# Patient Record
Sex: MALE | Race: WHITE | Employment: OTHER | URBAN - NONMETROPOLITAN AREA
[De-identification: names, ages, dates, MRNs, and addresses within clinical notes are randomized per-mention and may not be internally consistent; named-entity substitution may affect disease eponyms.]

---

## 2017-05-24 ENCOUNTER — HOSPITAL ENCOUNTER (OUTPATIENT)
Age: 47
Discharge: HOME | End: 2017-05-24
Payer: COMMERCIAL

## 2017-05-24 LAB
ALBUMIN SERPL-MCNC: 4.3 G/DL (ref 3.5–5.2)
ALBUMIN/GLOBULIN RATIO: 1.4 (ref 1–2.5)
ALP BLD-CCNC: 89 U/L (ref 40–129)
ALT SERPL-CCNC: 36 U/L (ref 5–41)
AST SERPL-CCNC: 26 U/L
BILIRUB SERPL-MCNC: 0.3 MG/DL (ref 0.3–1.2)
BILIRUBIN DIRECT: <0.08 MG/DL
BILIRUBIN, INDIRECT: NORMAL MG/DL (ref 0–1)
BUN BLDV-MCNC: 7 MG/DL (ref 6–20)
CREAT SERPL-MCNC: 0.8 MG/DL (ref 0.7–1.2)
GFR AFRICAN AMERICAN: >60 ML/MIN
GFR NON-AFRICAN AMERICAN: >60 ML/MIN
GFR SERPL CREATININE-BSD FRML MDRD: NORMAL ML/MIN/{1.73_M2}
GFR SERPL CREATININE-BSD FRML MDRD: NORMAL ML/MIN/{1.73_M2}
GLOBULIN: NORMAL G/DL (ref 1.5–3.8)
TOTAL PROTEIN: 7.3 G/DL (ref 6.4–8.3)

## 2017-05-24 PROCEDURE — 82565 ASSAY OF CREATININE: CPT

## 2017-05-24 PROCEDURE — 36415 COLL VENOUS BLD VENIPUNCTURE: CPT

## 2017-05-24 PROCEDURE — 84520 ASSAY OF UREA NITROGEN: CPT

## 2017-05-24 PROCEDURE — 80076 HEPATIC FUNCTION PANEL: CPT

## 2018-05-20 ENCOUNTER — APPOINTMENT (OUTPATIENT)
Dept: GENERAL RADIOLOGY | Age: 48
End: 2018-05-20
Payer: COMMERCIAL

## 2018-05-20 ENCOUNTER — HOSPITAL ENCOUNTER (EMERGENCY)
Age: 48
Discharge: HOME | End: 2018-05-20
Payer: COMMERCIAL

## 2018-05-20 VITALS
TEMPERATURE: 98.9 F | DIASTOLIC BLOOD PRESSURE: 82 MMHG | RESPIRATION RATE: 18 BRPM | HEART RATE: 91 BPM | SYSTOLIC BLOOD PRESSURE: 143 MMHG

## 2018-05-20 DIAGNOSIS — L03.114 CELLULITIS OF LEFT UPPER EXTREMITY: ICD-10-CM
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
## 2018-05-20 PROCEDURE — 90715 TDAP VACCINE 7 YRS/> IM: CPT | Performed by: PHYSICIAN ASSISTANT

## 2018-05-20 PROCEDURE — 73090 X-RAY EXAM OF FOREARM: CPT

## 2018-05-20 PROCEDURE — 99283 EMERGENCY DEPT VISIT LOW MDM: CPT

## 2018-05-20 PROCEDURE — 6370000000 HC RX 637 (ALT 250 FOR IP): Performed by: PHYSICIAN ASSISTANT

## 2018-05-20 PROCEDURE — 6360000002 HC RX W HCPCS: Performed by: PHYSICIAN ASSISTANT

## 2018-05-20 PROCEDURE — 90471 IMMUNIZATION ADMIN: CPT | Performed by: PHYSICIAN ASSISTANT

## 2018-05-20 RX ORDER — AMOXICILLIN AND CLAVULANATE POTASSIUM 875; 125 MG/1; MG/1
1 TABLET, FILM COATED ORAL ONCE
Status: COMPLETED | OUTPATIENT
Start: 2018-05-20 | End: 2018-05-20

## 2018-05-20 RX ORDER — AMOXICILLIN AND CLAVULANATE POTASSIUM 875; 125 MG/1; MG/1
1 TABLET, FILM COATED ORAL 2 TIMES DAILY
Qty: 20 TABLET | Refills: 0 | Status: SHIPPED | OUTPATIENT
Start: 2018-05-20 | End: 2018-05-30

## 2018-05-20 RX ADMIN — TETANUS TOXOID, REDUCED DIPHTHERIA TOXOID AND ACELLULAR PERTUSSIS VACCINE, ADSORBED 0.5 ML: 5; 2.5; 8; 8; 2.5 SUSPENSION INTRAMUSCULAR at 12:33

## 2018-05-20 RX ADMIN — AMOXICILLIN AND CLAVULANATE POTASSIUM 1 TABLET: 875; 125 TABLET, FILM COATED ORAL at 12:43

## 2018-05-20 ASSESSMENT — ENCOUNTER SYMPTOMS
VOMITING: 0
BACK PAIN: 0
EYE REDNESS: 0
SORE THROAT: 0
SHORTNESS OF BREATH: 0
DIARRHEA: 0
CHEST TIGHTNESS: 0
RHINORRHEA: 0
COUGH: 0
CONSTIPATION: 0
ABDOMINAL PAIN: 0
WHEEZING: 0
NAUSEA: 0
EYE DISCHARGE: 0
BLOOD IN STOOL: 0

## 2018-05-20 ASSESSMENT — PAIN DESCRIPTION - PAIN TYPE: TYPE: ACUTE PAIN

## 2018-05-20 ASSESSMENT — PAIN DESCRIPTION - ORIENTATION: ORIENTATION: LEFT

## 2018-05-20 ASSESSMENT — PAIN SCALES - GENERAL: PAINLEVEL_OUTOF10: 7

## 2018-05-20 ASSESSMENT — PAIN DESCRIPTION - LOCATION: LOCATION: ARM

## 2018-06-18 ENCOUNTER — HOSPITAL ENCOUNTER (OUTPATIENT)
Age: 48
Setting detail: SPECIMEN
Discharge: HOME | End: 2018-06-18
Payer: COMMERCIAL

## 2018-06-18 ENCOUNTER — OFFICE VISIT (OUTPATIENT)
Dept: UROLOGY | Age: 48
End: 2018-06-18
Payer: COMMERCIAL

## 2018-06-18 VITALS
WEIGHT: 216 LBS | SYSTOLIC BLOOD PRESSURE: 120 MMHG | BODY MASS INDEX: 30.24 KG/M2 | HEIGHT: 71 IN | DIASTOLIC BLOOD PRESSURE: 80 MMHG

## 2018-06-18 DIAGNOSIS — N52.9 ERECTILE DYSFUNCTION, UNSPECIFIED ERECTILE DYSFUNCTION TYPE: Primary | ICD-10-CM

## 2018-06-18 DIAGNOSIS — N52.9 ERECTILE DYSFUNCTION, UNSPECIFIED ERECTILE DYSFUNCTION TYPE: ICD-10-CM

## 2018-06-18 LAB
-: ABNORMAL
AMORPHOUS: ABNORMAL
BACTERIA: ABNORMAL
BILIRUBIN URINE: NEGATIVE
CASTS UA: ABNORMAL /LPF
COLOR: YELLOW
COMMENT UA: ABNORMAL
CRYSTALS, UA: ABNORMAL /HPF
EPITHELIAL CELLS UA: ABNORMAL /HPF (ref 0–5)
GLUCOSE URINE: NEGATIVE
KETONES, URINE: ABNORMAL
LEUKOCYTE ESTERASE, URINE: NEGATIVE
MUCUS: ABNORMAL
NITRITE, URINE: NEGATIVE
OTHER OBSERVATIONS UA: ABNORMAL
PH UA: 6 (ref 5–9)
PROTEIN UA: NEGATIVE
RBC UA: ABNORMAL /HPF (ref 0–2)
RENAL EPITHELIAL, UA: ABNORMAL /HPF
SPECIFIC GRAVITY UA: >1.03 (ref 1.01–1.02)
TRICHOMONAS: ABNORMAL
TURBIDITY: ABNORMAL
URINE HGB: NEGATIVE
UROBILINOGEN, URINE: NORMAL
WBC UA: ABNORMAL /HPF (ref 0–5)
YEAST: ABNORMAL

## 2018-06-18 PROCEDURE — 81001 URINALYSIS AUTO W/SCOPE: CPT

## 2018-06-18 PROCEDURE — G8417 CALC BMI ABV UP PARAM F/U: HCPCS | Performed by: NURSE PRACTITIONER

## 2018-06-18 PROCEDURE — 4004F PT TOBACCO SCREEN RCVD TLK: CPT | Performed by: NURSE PRACTITIONER

## 2018-06-18 PROCEDURE — G8427 DOCREV CUR MEDS BY ELIG CLIN: HCPCS | Performed by: NURSE PRACTITIONER

## 2018-06-18 PROCEDURE — 99214 OFFICE O/P EST MOD 30 MIN: CPT | Performed by: NURSE PRACTITIONER

## 2018-06-18 PROCEDURE — 87086 URINE CULTURE/COLONY COUNT: CPT

## 2018-06-18 RX ORDER — SILDENAFIL CITRATE 20 MG/1
TABLET ORAL
Qty: 30 TABLET | Refills: 3 | Status: SHIPPED | OUTPATIENT
Start: 2018-06-18 | End: 2019-09-18 | Stop reason: SDUPTHER

## 2018-06-18 ASSESSMENT — ENCOUNTER SYMPTOMS
COLOR CHANGE: 0
WHEEZING: 0
EYE REDNESS: 0
BACK PAIN: 0
CONSTIPATION: 0
COUGH: 0
EYE PAIN: 0
ABDOMINAL PAIN: 0
SHORTNESS OF BREATH: 0
VOMITING: 0
NAUSEA: 0

## 2018-06-19 LAB
CULTURE: NORMAL
Lab: NORMAL
SPECIMEN DESCRIPTION: NORMAL
STATUS: NORMAL

## 2018-08-23 ENCOUNTER — HOSPITAL ENCOUNTER (EMERGENCY)
Age: 48
Discharge: HOME | End: 2018-08-23
Attending: EMERGENCY MEDICINE
Payer: COMMERCIAL

## 2018-08-23 ENCOUNTER — APPOINTMENT (OUTPATIENT)
Dept: CT IMAGING | Age: 48
End: 2018-08-23
Payer: COMMERCIAL

## 2018-08-23 ENCOUNTER — APPOINTMENT (OUTPATIENT)
Dept: GENERAL RADIOLOGY | Age: 48
End: 2018-08-23
Payer: COMMERCIAL

## 2018-08-23 VITALS
TEMPERATURE: 97.5 F | DIASTOLIC BLOOD PRESSURE: 86 MMHG | HEART RATE: 77 BPM | WEIGHT: 220 LBS | SYSTOLIC BLOOD PRESSURE: 136 MMHG | HEIGHT: 72 IN | BODY MASS INDEX: 29.8 KG/M2 | RESPIRATION RATE: 16 BRPM | OXYGEN SATURATION: 98 %
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
## 2018-08-23 DIAGNOSIS — M79.601 RIGHT ARM PAIN: ICD-10-CM

## 2018-08-23 DIAGNOSIS — R51.9 NONINTRACTABLE HEADACHE, UNSPECIFIED CHRONICITY PATTERN, UNSPECIFIED HEADACHE TYPE: ICD-10-CM

## 2018-08-23 PROCEDURE — 72125 CT NECK SPINE W/O DYE: CPT

## 2018-08-23 PROCEDURE — 70450 CT HEAD/BRAIN W/O DYE: CPT

## 2018-08-23 PROCEDURE — 6370000000 HC RX 637 (ALT 250 FOR IP): Performed by: EMERGENCY MEDICINE

## 2018-08-23 PROCEDURE — 99284 EMERGENCY DEPT VISIT MOD MDM: CPT

## 2018-08-23 PROCEDURE — 73030 X-RAY EXAM OF SHOULDER: CPT

## 2018-08-23 PROCEDURE — 73060 X-RAY EXAM OF HUMERUS: CPT

## 2018-08-23 PROCEDURE — 73080 X-RAY EXAM OF ELBOW: CPT

## 2018-08-23 RX ORDER — ACETAMINOPHEN 500 MG
1000 TABLET ORAL ONCE
Status: COMPLETED | OUTPATIENT
Start: 2018-08-23 | End: 2018-08-23

## 2018-08-23 RX ORDER — CYCLOBENZAPRINE HCL 10 MG
10 TABLET ORAL 3 TIMES DAILY PRN
Qty: 10 TABLET | Refills: 0 | Status: SHIPPED | OUTPATIENT
Start: 2018-08-23 | End: 2018-09-02

## 2018-08-23 RX ORDER — CYCLOBENZAPRINE HCL 10 MG
10 TABLET ORAL ONCE
Status: COMPLETED | OUTPATIENT
Start: 2018-08-23 | End: 2018-08-23

## 2018-08-23 RX ADMIN — ACETAMINOPHEN 1000 MG: 500 TABLET ORAL at 10:33

## 2018-08-23 RX ADMIN — CYCLOBENZAPRINE 10 MG: 10 TABLET, FILM COATED ORAL at 10:33

## 2018-08-23 ASSESSMENT — ENCOUNTER SYMPTOMS
RHINORRHEA: 0
VOMITING: 0
SORE THROAT: 0
COUGH: 0
BACK PAIN: 1
SHORTNESS OF BREATH: 0
NAUSEA: 0
ABDOMINAL DISTENTION: 0
WHEEZING: 0
DIARRHEA: 0
PHOTOPHOBIA: 0
CONSTIPATION: 0

## 2018-08-23 ASSESSMENT — PAIN DESCRIPTION - PAIN TYPE: TYPE: ACUTE PAIN

## 2018-08-23 ASSESSMENT — PAIN DESCRIPTION - DESCRIPTORS: DESCRIPTORS: THROBBING;SHARP

## 2018-08-23 ASSESSMENT — PAIN SCALES - GENERAL
PAINLEVEL_OUTOF10: 7
PAINLEVEL_OUTOF10: 7

## 2018-08-23 ASSESSMENT — PAIN DESCRIPTION - LOCATION: LOCATION: ARM;HEAD;NECK

## 2018-08-23 ASSESSMENT — PAIN DESCRIPTION - ORIENTATION: ORIENTATION: RIGHT

## 2018-08-23 NOTE — ED NOTES
Discharge education reviewed with patient at this time. Patient verbalized understanding of needing to travel directly to [facility] for urine drug screen, he also verbalized understanding of need to follow-up with PCP and pain control. Patient denies any further questions/concerns at this time.       Jp Avalos RN  08/23/18 1141

## 2018-08-23 NOTE — ED PROVIDER NOTES
Narrative    No narrative on file       Family History   Problem Relation Age of Onset    Diabetes Mother     Heart Disease Mother     Diabetes Father     Other Father        Allergies:  Patient has no known allergies. Home Medications:  Prior to Admission medications    Medication Sig Start Date End Date Taking? Authorizing Provider   cyclobenzaprine (FLEXERIL) 10 MG tablet Take 1 tablet by mouth 3 times daily as needed for Muscle spasms 8/23/18 9/2/18 Yes Lord Lat, DO   Ranitidine HCl (RANITIDINE 75 PO) Take 150 mg by mouth daily   Yes Historical Provider, MD   sildenafil (REVATIO) 20 MG tablet Take 1-5 tabs PRN for ED. Take on an empty stomach 30 minutes prior to activity. 6/18/18   ADRIANA Gramajo - CNP   tadalafil (CIALIS) 5 MG tablet Take 1 tablet by mouth as needed for Erectile Dysfunction 10/14/16   ADRIANA Lowe - CNP       REVIEW OF SYSTEMS    (2-9 systems for level 4, 10 or more for level 5)      Review of Systems   Constitutional: Negative for activity change, appetite change, fatigue and fever. HENT: Negative for congestion, rhinorrhea and sore throat. Eyes: Negative for photophobia and visual disturbance. Respiratory: Negative for cough, shortness of breath and wheezing. Cardiovascular: Negative for chest pain, palpitations and leg swelling. Gastrointestinal: Negative for abdominal distention, constipation, diarrhea, nausea and vomiting. Genitourinary: Negative for decreased urine volume and dysuria. Musculoskeletal: Positive for arthralgias, back pain, myalgias and neck pain. Skin: Negative for rash. Neurological: Positive for headaches. Negative for dizziness, weakness, light-headedness and numbness.        PHYSICAL EXAM   (up to 7 for level 4, 8 or more for level 5)      INITIAL VITALS:   BP (!) 129/90   Pulse 77   Temp 97.5 °F (36.4 °C) (Tympanic)   Resp 16   Ht 6' (1.829 m)   Wt 220 lb (99.8 kg)   SpO2 100%   BMI 29.84 kg/m² Physical Exam   Constitutional: He is oriented to person, place, and time. Patient is awake alert, oriented to person place and time, extraocular movements are intact, he holds his right arm in contracture, and 90° flexion. HENT:   Head: Normocephalic and atraumatic. Eyes: Conjunctivae are normal. Right eye exhibits no discharge. Left eye exhibits no discharge. Neck:   Midline cervical tenderness to palpation, c-collar has been placed. No midline thoracic or lumbar tenderness to palpation. Patient does have pain to the left side paraspinal musculature . No contusion or ecchymosis noted to this area   Cardiovascular: Normal rate, regular rhythm and normal heart sounds. Exam reveals no gallop and no friction rub. No murmur heard. Pulmonary/Chest: Effort normal and breath sounds normal. No respiratory distress. He has no wheezes. He has no rales. He exhibits no tenderness. Abdominal: Soft. He exhibits no distension and no mass. There is no tenderness. There is no rebound and no guarding. Musculoskeletal:   Patient does have pain to the right trapezius musculature, as well as the anterior aspect of the right humerus. 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 The does have some mild tenderness with movement. He also has pain over the humerus on the right side, as well as some pain with flexion and extension although only approximately 5° range of motion at that joint which is chronic. No deformity noted no erythema or edema noted. He has palpable radial pulses are equal bilaterally with 5 out of 5 hand grasp strength. He moves his lower extremities at hips knees and ankles and full range of motion, without pain, pelvis is stable. He does have 5 out of 5 lower extremity motor strength, and sensation of light touch intact in his lower extremities. Neurological: He is alert and oriented to person, place, and time. No cranial nerve deficit.  He exhibits normal muscle tone. Coordination normal.   Skin: Skin is warm and dry. No rash noted. Nursing note and vitals reviewed. DIFFERENTIAL  DIAGNOSIS     Patient with fall, positive loss of consciousness, fall was mechanical.  He hit his head is complaining of headache at this time. He is neurovascularly intact but continues to complain of a headache, as well as neck pain. Given loss of consciousness and continued headache plan to get CT head as well as CT cervical.  We'll plan on x-ray imaging of his right upper arm. Pain is more musculoskeletal in his lower back and we'll cover with Tylenol as well as Flexeril. Patient also took and said this morning    PLAN (LABS / IMAGING / EKG):  Orders Placed This Encounter   Procedures    CT Head WO Contrast    CT Cervical Spine WO Contrast    XR SHOULDER RIGHT (MIN 2 VIEWS)    XR HUMERUS RIGHT (MIN 2 VIEWS)    XR ELBOW RIGHT (MIN 3 VIEWS)    Cervical collar       MEDICATIONS ORDERED:  Orders Placed This Encounter   Medications    acetaminophen (TYLENOL) tablet 1,000 mg    cyclobenzaprine (FLEXERIL) tablet 10 mg    cyclobenzaprine (FLEXERIL) 10 MG tablet     Sig: Take 1 tablet by mouth 3 times daily as needed for Muscle spasms     Dispense:  10 tablet     Refill:  0       DIAGNOSTIC RESULTS / EMERGENCY DEPARTMENT COURSE / MDM     LABS:  No results found for this visit on 08/23/18. IMPRESSION: fall, head ache, neck pain, R arm pain    RADIOLOGY:  Xr Shoulder Right (min 2 Views)    Result Date: 8/23/2018  REPORT: 3 views right shoulder INDICATION: Pain status post fall FINDINGS: No acute fracture or dislocation is seen. Mild degenerative changes of the glenohumeral joint. [facility] joint and subacromial space are normal. Clavicle appears to be intact. Benign bone islands.  Final report electronically signed by Olivia Alvarado on 8/23/2018 11:03 AM    NO ACUTE FRACTURE    Xr Humerus Right (min 2 Views)    Result Date: 8/23/2018  REPORT: 2 views right humerus INDICATION: Pain status post fall FINDINGS: No acute fracture. No lytic or blastic lesion. Final report electronically signed by Dixie Adams on 8/23/2018 11:02 AM    NO ACUTE FRACTURE    Xr Elbow Right (min 3 Views)    Result Date: 8/23/2018  REPORT: 3 views right elbow INDICATION: Pain status post fall FINDINGS: No acute fracture or dislocation is seen. Joint spaces are preserved. No joint effusion or soft tissue swelling. Final report electronically signed by Dixie Adams on 8/23/2018 11:01 AM    NORMAL    Ct Head Wo Contrast    Result Date: 8/23/2018  REPORT: CT head without contrast TECHNIQUE: Contiguous thin axial slices through the head obtained without IV contrast. INDICATION: Persistent headache, loss of consciousness, fall FINDINGS: No evidence of acute cerebral cortical infarction, hemorrhage or mass lesion. Normal white matter density. Normal cerebral and cerebellar volume. No hydrocephalus. Calvarium is intact. Paranasal sinuses and mastoid air cells are clear. Final report electronically signed by Dixie Adams on 8/23/2018 10:29 AM    Normal CT head    Ct Cervical Spine Wo Contrast    Result Date: 8/23/2018  REPORT: CT cervical spine without contrast TECHNIQUE: Contiguous thin axial slices through the cervical spine obtained without contrast. Axial, coronal and sagittal reformats made. INDICATION: Neck pain status post fall, loss of consciousness FINDINGS: No previous examinations available for comparison. Underlying hypertrophic degenerative changes Vertebral body heights and alignment are well-maintained. There is no evidence of acute fracture. The odontoid process, occipital condyles and C1 arch are intact. Disc heights are preserved. No significant central canal or neural foraminal stenosis. No acute disc herniation is seen.  Prevertebral soft tissues are normal. Final report electronically signed by Dixie Adams on 8/23/2018 11:01 AM    No findings to suggest acute cervical spine injury        EMERGENCY DEPARTMENT

## 2018-08-23 NOTE — ED NOTES
Lab called stating no one is available to do drug screen and to contact nursing supervisor. I contacted [place] supervisor who advised pt is to go to [facility] if during normal business hours. Notified nurse BACILIO Gray  08/23/18 1016

## 2018-09-19 ENCOUNTER — OFFICE VISIT (OUTPATIENT)
Dept: UROLOGY | Age: 48
End: 2018-09-19
Payer: COMMERCIAL

## 2018-09-19 VITALS
DIASTOLIC BLOOD PRESSURE: 80 MMHG | WEIGHT: 220 LBS | HEIGHT: 72 IN | SYSTOLIC BLOOD PRESSURE: 130 MMHG | BODY MASS INDEX: 29.8 KG/M2

## 2018-09-19 DIAGNOSIS — N52.9 ERECTILE DYSFUNCTION, UNSPECIFIED ERECTILE DYSFUNCTION TYPE: Primary | ICD-10-CM

## 2018-09-19 PROCEDURE — 4004F PT TOBACCO SCREEN RCVD TLK: CPT | Performed by: NURSE PRACTITIONER

## 2018-09-19 PROCEDURE — G8427 DOCREV CUR MEDS BY ELIG CLIN: HCPCS | Performed by: NURSE PRACTITIONER

## 2018-09-19 PROCEDURE — G8417 CALC BMI ABV UP PARAM F/U: HCPCS | Performed by: NURSE PRACTITIONER

## 2018-09-19 PROCEDURE — 99213 OFFICE O/P EST LOW 20 MIN: CPT | Performed by: NURSE PRACTITIONER

## 2018-09-19 ASSESSMENT — ENCOUNTER SYMPTOMS
EYE REDNESS: 0
ABDOMINAL PAIN: 0
BACK PAIN: 0
EYE PAIN: 0
NAUSEA: 0
CONSTIPATION: 0
SHORTNESS OF BREATH: 0
COLOR CHANGE: 0
VOMITING: 0
WHEEZING: 0
COUGH: 0

## 2018-09-19 NOTE — PROGRESS NOTES
Subjective:      Patient ID: Jermaine Rosa is a 50 y.o. male. HPI  Patient is a 50 y.o. male in no acute distress and alert and oriented to person, place and time. History  2016 Evaluated for ED. Given cialis 5mg as needed. LOST TO FOLLOW-UP    6/2018 Complaints of ED. He is unable to achieve or maintain an erection that is satisfactory for sexual activity. He denies HTN, DM, NORA, or any other co-morbid conditions. His BMI is 30.13. He does smoke and does not exercise. He did have good results with the cialis, but he can not afford this medication. CHARLEY 18, Started on revatio. Today  Here today for a 3 month follow-up for erectile dysfunction. At his last visit we did start him on Revatio. He is using 20 mg prior to sexual activity. With this dose he is able to achieve and maintain an erection satisfactory for sexual activity. He denies any chest pain or shortness of breath. He does continue to smoke despite recommendations to stop smoking. Sexual Health Inventory For Men  1. How do you rate your confidence that you could get and keep an erection? 2  2. When you had erections with sexual stimulation, how often were your erections hard enough for penetration? 4  3. During sexual intercourse, how often were you able to maintain your erection after you had penetrated your partner? 5  4. During sexual intercourse, ho difficult was it to maintain your erection to completion of intercourse? 4  5. When you attempted sexual intercourse, how often was it satisfactory for you?  4  Total 19, mild ED  Past Medical History:   Diagnosis Date    Erb's palsy as birth trauma     GERD (gastroesophageal reflux disease)      Past Surgical History:   Procedure Laterality Date    DENTAL SURGERY       Family History   Problem Relation Age of Onset    Diabetes Mother     Heart Disease Mother     Diabetes Father     Other Father      Current Outpatient Prescriptions on File Prior to Visit   Medication Sig Dispense Refill    sildenafil (REVATIO) 20 MG tablet Take 1-5 tabs PRN for ED. Take on an empty stomach 30 minutes prior to activity. 30 tablet 3    Ranitidine HCl (RANITIDINE 75 PO) Take 150 mg by mouth daily prn       No current facility-administered medications on file prior to visit. Outpatient Encounter Prescriptions as of 9/19/2018   Medication Sig Dispense Refill    sildenafil (REVATIO) 20 MG tablet Take 1-5 tabs PRN for ED. Take on an empty stomach 30 minutes prior to activity. 30 tablet 3    Ranitidine HCl (RANITIDINE 75 PO) Take 150 mg by mouth daily prn      [DISCONTINUED] tadalafil (CIALIS) 5 MG tablet Take 1 tablet by mouth as needed for Erectile Dysfunction 20 tablet 0     No facility-administered encounter medications on file as of 9/19/2018. Patient has no known allergies. History   Smoking Status    Current Every Day Smoker    Packs/day: 0.50    Types: Cigarettes   Smokeless Tobacco    Never Used       History   Alcohol Use    Yes     Comment: weekend       Vitals:    09/19/18 1537   BP: 130/80       Constitutional: Patient in no acute distress; Neuro: alert and oriented to person place and time. Psych: Mood and affect normal.  Skin: Normal  Lungs: Respiratory effort normal  Cardiovascular:  Normal peripheral pulses  Abdomen: Soft, non-tender, non-distended with no CVA, flank pain, hepatosplenomegaly or hernia. Kidneys normal.  Bladder non-tender and not distended. Lymphatics: no palpable lymphadenopathy  Penis normal  Urethral meatus normal  Scrotal exam normal  Testicles normal bilaterally    No results found for: PSA  Lab Results   Component Value Date    BUN 7 05/24/2017     Lab Results   Component Value Date    CREATININE 0.80 05/24/2017       No results found for this visit on 09/19/18. Review of Systems   Constitutional: Negative for appetite change, chills and fever. Eyes: Negative for pain, redness and visual disturbance.    Respiratory: Negative for cough, shortness

## 2019-02-05 ENCOUNTER — HOSPITAL ENCOUNTER (OUTPATIENT)
Age: 49
Discharge: HOME | End: 2019-02-05
Payer: COMMERCIAL

## 2019-02-05 DIAGNOSIS — Z12.5 SCREENING PSA (PROSTATE SPECIFIC ANTIGEN): ICD-10-CM

## 2019-02-05 DIAGNOSIS — Z00.00 ROUTINE GENERAL MEDICAL EXAMINATION AT A HEALTH CARE FACILITY: ICD-10-CM

## 2019-02-05 LAB
ALT SERPL-CCNC: 21 U/L (ref 5–41)
ANION GAP SERPL CALCULATED.3IONS-SCNC: 12 MMOL/L (ref 9–17)
AST SERPL-CCNC: 22 U/L
BUN BLDV-MCNC: 9 MG/DL (ref 6–20)
BUN/CREAT BLD: 12 (ref 9–20)
CALCIUM SERPL-MCNC: 10 MG/DL (ref 8.6–10.4)
CHLORIDE BLD-SCNC: 103 MMOL/L (ref 98–107)
CHOLESTEROL/HDL RATIO: 2.9
CHOLESTEROL: 161 MG/DL
CO2: 24 MMOL/L (ref 20–31)
CREAT SERPL-MCNC: 0.78 MG/DL (ref 0.7–1.2)
GFR AFRICAN AMERICAN: >60 ML/MIN
GFR NON-AFRICAN AMERICAN: >60 ML/MIN
GFR SERPL CREATININE-BSD FRML MDRD: NORMAL ML/MIN/{1.73_M2}
GFR SERPL CREATININE-BSD FRML MDRD: NORMAL ML/MIN/{1.73_M2}
GLUCOSE BLD-MCNC: 91 MG/DL (ref 70–99)
HDLC SERPL-MCNC: 56 MG/DL
LDL CHOLESTEROL: 93 MG/DL (ref 0–130)
POTASSIUM SERPL-SCNC: 4 MMOL/L (ref 3.7–5.3)
PROSTATE SPECIFIC ANTIGEN: 0.46 UG/L
SODIUM BLD-SCNC: 139 MMOL/L (ref 135–144)
TRIGL SERPL-MCNC: 62 MG/DL
VLDLC SERPL CALC-MCNC: NORMAL MG/DL (ref 1–30)

## 2019-02-05 PROCEDURE — 80048 BASIC METABOLIC PNL TOTAL CA: CPT

## 2019-02-05 PROCEDURE — 84450 TRANSFERASE (AST) (SGOT): CPT

## 2019-02-05 PROCEDURE — 80061 LIPID PANEL: CPT

## 2019-02-05 PROCEDURE — G0103 PSA SCREENING: HCPCS

## 2019-02-05 PROCEDURE — 84460 ALANINE AMINO (ALT) (SGPT): CPT

## 2019-02-05 PROCEDURE — 36415 COLL VENOUS BLD VENIPUNCTURE: CPT

## 2019-09-18 ENCOUNTER — OFFICE VISIT (OUTPATIENT)
Dept: UROLOGY | Age: 49
End: 2019-09-18
Payer: COMMERCIAL

## 2019-09-18 VITALS
HEIGHT: 72 IN | DIASTOLIC BLOOD PRESSURE: 76 MMHG | HEART RATE: 69 BPM | WEIGHT: 215 LBS | BODY MASS INDEX: 29.12 KG/M2 | SYSTOLIC BLOOD PRESSURE: 136 MMHG

## 2019-09-18 DIAGNOSIS — N52.9 ERECTILE DYSFUNCTION, UNSPECIFIED ERECTILE DYSFUNCTION TYPE: Primary | ICD-10-CM

## 2019-09-18 PROCEDURE — 99213 OFFICE O/P EST LOW 20 MIN: CPT | Performed by: UROLOGY

## 2019-09-18 PROCEDURE — G8417 CALC BMI ABV UP PARAM F/U: HCPCS | Performed by: UROLOGY

## 2019-09-18 PROCEDURE — 4004F PT TOBACCO SCREEN RCVD TLK: CPT | Performed by: UROLOGY

## 2019-09-18 PROCEDURE — G8427 DOCREV CUR MEDS BY ELIG CLIN: HCPCS | Performed by: UROLOGY

## 2019-09-18 PROCEDURE — 3017F COLORECTAL CA SCREEN DOC REV: CPT | Performed by: UROLOGY

## 2019-09-18 RX ORDER — SILDENAFIL CITRATE 20 MG/1
TABLET ORAL
Qty: 30 TABLET | Refills: 3 | Status: SHIPPED | OUTPATIENT
Start: 2019-09-18 | End: 2020-11-09

## 2019-09-18 ASSESSMENT — ENCOUNTER SYMPTOMS
WHEEZING: 0
COLOR CHANGE: 0
EYE REDNESS: 0
COUGH: 0
NAUSEA: 0
ABDOMINAL PAIN: 0
EYE PAIN: 0
VOMITING: 0
BACK PAIN: 0
SHORTNESS OF BREATH: 0

## 2019-09-18 NOTE — PROGRESS NOTES
tablet 3    Ranitidine HCl (RANITIDINE 75 PO) Take 150 mg by mouth daily prn       No facility-administered encounter medications on file as of 9/18/2019. Current Outpatient Medications on File Prior to Visit   Medication Sig Dispense Refill    sildenafil (REVATIO) 20 MG tablet Take 1-5 tabs PRN for ED. Take on an empty stomach 30 minutes prior to activity. 30 tablet 3    Ranitidine HCl (RANITIDINE 75 PO) Take 150 mg by mouth daily prn       No current facility-administered medications on file prior to visit. Patient has no known allergies. Family History   Problem Relation Age of Onset    Diabetes Mother     Heart Disease Mother     Diabetes Father     Other Father      Social History     Tobacco Use   Smoking Status Current Every Day Smoker    Packs/day: 0.50    Types: Cigarettes   Smokeless Tobacco Never Used       Social History     Substance and Sexual Activity   Alcohol Use Yes    Comment: weekend       Review of Systems   Constitutional: Negative for appetite change, chills and fever. Eyes: Negative for pain, redness and visual disturbance. Respiratory: Negative for cough, shortness of breath and wheezing. Cardiovascular: Negative for chest pain and leg swelling. Gastrointestinal: Negative for abdominal pain, nausea and vomiting. Genitourinary: Negative for difficulty urinating, discharge, dysuria, flank pain, frequency, hematuria, scrotal swelling and testicular pain. Musculoskeletal: Negative for back pain, joint swelling and myalgias. Skin: Negative for color change, rash and wound. Neurological: Negative for dizziness, tremors and numbness. Hematological: Negative for adenopathy. Does not bruise/bleed easily. /76 (Site: Left Upper Arm, Position: Sitting, Cuff Size: Medium Adult)   Pulse 69   Ht 6' (1.829 m)   Wt 215 lb (97.5 kg)   BMI 29.16 kg/m²       PHYSICAL EXAM:  Constitutional: Patient in no acute distress;    Neuro: alert and oriented to person

## 2020-09-14 ENCOUNTER — OFFICE VISIT (OUTPATIENT)
Dept: UROLOGY | Age: 50
End: 2020-09-14
Payer: COMMERCIAL

## 2020-09-14 VITALS
HEIGHT: 72 IN | SYSTOLIC BLOOD PRESSURE: 158 MMHG | DIASTOLIC BLOOD PRESSURE: 89 MMHG | BODY MASS INDEX: 28.04 KG/M2 | HEART RATE: 72 BPM | TEMPERATURE: 98.7 F | WEIGHT: 207 LBS

## 2020-09-14 PROCEDURE — 99213 OFFICE O/P EST LOW 20 MIN: CPT | Performed by: PHYSICIAN ASSISTANT

## 2020-09-14 PROCEDURE — 4004F PT TOBACCO SCREEN RCVD TLK: CPT | Performed by: PHYSICIAN ASSISTANT

## 2020-09-14 PROCEDURE — 3017F COLORECTAL CA SCREEN DOC REV: CPT | Performed by: PHYSICIAN ASSISTANT

## 2020-09-14 PROCEDURE — G8427 DOCREV CUR MEDS BY ELIG CLIN: HCPCS | Performed by: PHYSICIAN ASSISTANT

## 2020-09-14 PROCEDURE — G8417 CALC BMI ABV UP PARAM F/U: HCPCS | Performed by: PHYSICIAN ASSISTANT

## 2020-09-14 RX ORDER — DICLOFENAC SODIUM 75 MG/1
TABLET, DELAYED RELEASE ORAL PRN
COMMUNITY
Start: 2020-09-10 | End: 2021-09-22

## 2020-09-14 ASSESSMENT — ENCOUNTER SYMPTOMS
EYE REDNESS: 0
VOMITING: 0
NAUSEA: 0
CONSTIPATION: 0
EYE PAIN: 0
BACK PAIN: 0
SHORTNESS OF BREATH: 0
WHEEZING: 0
ABDOMINAL PAIN: 0
COLOR CHANGE: 0
COUGH: 0

## 2020-09-14 NOTE — PATIENT INSTRUCTIONS
SURVEY:    You may be receiving a survey from [facility] regarding your visit today. Please complete the survey to enable us to provide the highest quality of care to you and your family. If you cannot score us a very good on any question, please call the office to discuss how we could of made your experience a very good one. Thank you.

## 2020-09-14 NOTE — PROGRESS NOTES
PSA 0.46 02/05/2019       ASSESSMENT:   Diagnosis Orders   1.  Erectile dysfunction, unspecified erectile dysfunction type             PLAN:  Continue Revatio    Follow-up in 1 year

## 2020-11-09 RX ORDER — SILDENAFIL CITRATE 20 MG/1
TABLET ORAL
Qty: 30 TABLET | Refills: 3 | Status: SHIPPED | OUTPATIENT
Start: 2020-11-09 | End: 2021-09-08 | Stop reason: SDUPTHER

## 2020-11-09 NOTE — TELEPHONE ENCOUNTER
Patient seen within the past 3 months. Patient tolerates this medication well. We will send refills.

## 2021-09-08 ENCOUNTER — OFFICE VISIT (OUTPATIENT)
Dept: UROLOGY | Age: 51
End: 2021-09-08
Payer: MEDICAID

## 2021-09-08 VITALS
DIASTOLIC BLOOD PRESSURE: 98 MMHG | BODY MASS INDEX: 27.67 KG/M2 | HEART RATE: 79 BPM | SYSTOLIC BLOOD PRESSURE: 151 MMHG | WEIGHT: 204 LBS

## 2021-09-08 DIAGNOSIS — N52.9 ERECTILE DYSFUNCTION, UNSPECIFIED ERECTILE DYSFUNCTION TYPE: Primary | ICD-10-CM

## 2021-09-08 PROCEDURE — 3017F COLORECTAL CA SCREEN DOC REV: CPT | Performed by: PHYSICIAN ASSISTANT

## 2021-09-08 PROCEDURE — 99213 OFFICE O/P EST LOW 20 MIN: CPT | Performed by: PHYSICIAN ASSISTANT

## 2021-09-08 PROCEDURE — G8427 DOCREV CUR MEDS BY ELIG CLIN: HCPCS | Performed by: PHYSICIAN ASSISTANT

## 2021-09-08 PROCEDURE — 4004F PT TOBACCO SCREEN RCVD TLK: CPT | Performed by: PHYSICIAN ASSISTANT

## 2021-09-08 PROCEDURE — G8417 CALC BMI ABV UP PARAM F/U: HCPCS | Performed by: PHYSICIAN ASSISTANT

## 2021-09-08 RX ORDER — SILDENAFIL CITRATE 20 MG/1
TABLET ORAL
Qty: 30 TABLET | Refills: 3 | Status: SHIPPED | OUTPATIENT
Start: 2021-09-08 | End: 2022-10-11 | Stop reason: SDUPTHER

## 2021-09-08 ASSESSMENT — ENCOUNTER SYMPTOMS
SHORTNESS OF BREATH: 0
COLOR CHANGE: 0
CONSTIPATION: 0
EYE REDNESS: 0
WHEEZING: 0
ABDOMINAL PAIN: 0
COUGH: 0
VOMITING: 0
NAUSEA: 0
BACK PAIN: 0

## 2021-09-08 NOTE — PROGRESS NOTES
HPI:      Patient is a 44 y.o. male in no acute distress. He is alert and oriented to person, place, and time. History  2016 Evaluated for ED. Given cialis 5mg as needed. LOST TO FOLLOW-UP     6/2018 Complaints of ED. He is unable to achieve or maintain an erection that is satisfactory for sexual activity. He denies HTN, DM, NORA, or any other co-morbid conditions. His BMI is 30.13. He does smoke and does not exercise. He did have good results with the cialis, but he can not afford this medication. CHARLEY 18, Started on revatio. Today:  Patient is here today for annual follow-up for erectile dysfunction. Patient has been taking Revatio 20mg as needed. He is able to achieve and maintain erection with this dosage. He states that if he takes 1 tab on empty stomach he does get a little jittery. If he takes it with food he does not have these symptoms. He denies any chest pain, shortness of breath, dyspnea on exertion. He has not had any erections lasting more than 4 hours. he denies any new or worsening urinary tract symptoms. He is a current every day smoker.         Past Medical History:   Diagnosis Date    Erb's palsy as birth trauma     GERD (gastroesophageal reflux disease)     Wears glasses      Past Surgical History:   Procedure Laterality Date    DENTAL SURGERY       Outpatient Encounter Medications as of 9/8/2021   Medication Sig Dispense Refill    sildenafil (REVATIO) 20 MG tablet TAKE 1-5 TABLETS BY MOUTH AS NEEDED FOR E.D. TAKE ON AN EMPTY [id number] MINUTES PRIOR TO ACTIVITY 30 tablet 3    diclofenac (VOLTAREN) 75 MG EC tablet as needed       [DISCONTINUED] sildenafil (REVATIO) 20 MG tablet TAKE 1-5 TABLETS BY MOUTH AS NEEDED FOR E.D. TAKE ON AN EMPTY [id number] MINUTES PRIOR TO ACTIVITY 30 tablet 3    [DISCONTINUED] Ranitidine HCl (RANITIDINE 75 PO) Take 150 mg by mouth daily prn (Patient not taking: Reported on 9/8/2021)       No facility-administered encounter medications on file as of 9/8/2021. Current Outpatient Medications on File Prior to Visit   Medication Sig Dispense Refill    diclofenac (VOLTAREN) 75 MG EC tablet as needed        No current facility-administered medications on file prior to visit. Patient has no known allergies. Family History   Problem Relation Age of Onset    Diabetes Mother     Heart Disease Mother     Diabetes Father     Other Father      Social History     Tobacco Use   Smoking Status Current Every Day Smoker    Packs/day: 0.50    Types: Cigarettes   Smokeless Tobacco Never Used       Social History     Substance and Sexual Activity   Alcohol Use Yes    Comment: weekend       Review of Systems   Constitutional: Negative for appetite change, chills and fever. Eyes: Negative for redness and visual disturbance. Respiratory: Negative for cough, shortness of breath and wheezing. Cardiovascular: Negative for chest pain and leg swelling. Gastrointestinal: Negative for abdominal pain, constipation, nausea and vomiting. Genitourinary: Negative for decreased urine volume, difficulty urinating, discharge, dysuria, enuresis, flank pain, frequency, hematuria, penile pain, scrotal swelling, testicular pain and urgency. Positive for erectile dysfunction   Musculoskeletal: Negative for back pain, joint swelling and myalgias. Skin: Negative for color change, rash and wound. Neurological: Negative for dizziness, tremors and numbness. Hematological: Negative for adenopathy. Does not bruise/bleed easily. BP (!) 151/98 (Site: Right Upper Arm, Position: Sitting, Cuff Size: Medium Adult)   Pulse 79   Wt 204 lb (92.5 kg)   BMI 27.67 kg/m²       PHYSICAL EXAM:  Constitutional: Patient in no acute distress; Neuro: alert and oriented to person place and time.     Psych: Mood and affect normal.  Lungs: Respiratory effort normal  Abdomen: Soft, non-tender, non-distended  Rectal: deferred       Lab Results   Component Value Date BUN 9 02/05/2019     Lab Results   Component Value Date    CREATININE 0.78 02/05/2019     Lab Results   Component Value Date    PSA 0.46 02/05/2019       ASSESSMENT:   Diagnosis Orders   1.  Erectile dysfunction, unspecified erectile dysfunction type  sildenafil (REVATIO) 20 MG tablet     PLAN:  Continue Revatio 20 mg as needed     Follow-up in 1 year

## 2022-05-11 ENCOUNTER — HOSPITAL ENCOUNTER (OUTPATIENT)
Age: 52
Discharge: HOME | End: 2022-05-11
Payer: MEDICAID

## 2022-05-11 DIAGNOSIS — Z00.00 ROUTINE GENERAL MEDICAL EXAMINATION AT A HEALTH CARE FACILITY: ICD-10-CM

## 2022-05-11 LAB
ALT SERPL-CCNC: 13 U/L (ref 5–41)
ANION GAP SERPL CALCULATED.3IONS-SCNC: 7 MMOL/L (ref 9–17)
AST SERPL-CCNC: 15 U/L
BUN BLDV-MCNC: 15 MG/DL (ref 6–20)
BUN/CREAT BLD: 18 (ref 9–20)
CALCIUM SERPL-MCNC: 10 MG/DL (ref 8.6–10.4)
CHLORIDE BLD-SCNC: 103 MMOL/L (ref 98–107)
CHOLESTEROL/HDL RATIO: 3.1
CHOLESTEROL: 166 MG/DL
CO2: 27 MMOL/L (ref 20–31)
CREAT SERPL-MCNC: 0.82 MG/DL (ref 0.7–1.2)
GFR AFRICAN AMERICAN: >60 ML/MIN
GFR NON-AFRICAN AMERICAN: >60 ML/MIN
GFR SERPL CREATININE-BSD FRML MDRD: ABNORMAL ML/MIN/{1.73_M2}
GFR SERPL CREATININE-BSD FRML MDRD: ABNORMAL ML/MIN/{1.73_M2}
GLUCOSE BLD-MCNC: 97 MG/DL (ref 70–99)
HCT VFR BLD CALC: 44.9 % (ref 40.7–50.3)
HDLC SERPL-MCNC: 53 MG/DL
HEMOGLOBIN: 14.6 G/DL (ref 13–17)
LDL CHOLESTEROL: 97 MG/DL (ref 0–130)
MCH RBC QN AUTO: 30.2 PG (ref 25.2–33.5)
MCHC RBC AUTO-ENTMCNC: 32.5 G/DL (ref 28.4–34.8)
MCV RBC AUTO: 93 FL (ref 82.6–102.9)
NRBC AUTOMATED: 0 PER 100 WBC
PDW BLD-RTO: 13.1 % (ref 11.8–14.4)
PLATELET # BLD: 174 K/UL (ref 138–453)
PMV BLD AUTO: 9.7 FL (ref 8.1–13.5)
POTASSIUM SERPL-SCNC: 4.6 MMOL/L (ref 3.7–5.3)
RBC # BLD: 4.83 M/UL (ref 4.21–5.77)
SODIUM BLD-SCNC: 137 MMOL/L (ref 135–144)
TRIGL SERPL-MCNC: 79 MG/DL
WBC # BLD: 6.7 K/UL (ref 3.5–11.3)

## 2022-05-11 PROCEDURE — 85027 COMPLETE CBC AUTOMATED: CPT

## 2022-05-11 PROCEDURE — 36415 COLL VENOUS BLD VENIPUNCTURE: CPT

## 2022-05-11 PROCEDURE — 80061 LIPID PANEL: CPT

## 2022-05-11 PROCEDURE — 84460 ALANINE AMINO (ALT) (SGPT): CPT

## 2022-05-11 PROCEDURE — 80048 BASIC METABOLIC PNL TOTAL CA: CPT

## 2022-05-11 PROCEDURE — 84450 TRANSFERASE (AST) (SGOT): CPT

## 2022-10-11 ENCOUNTER — OFFICE VISIT (OUTPATIENT)
Dept: UROLOGY | Age: 52
End: 2022-10-11
Payer: MEDICAID

## 2022-10-11 VITALS
DIASTOLIC BLOOD PRESSURE: 83 MMHG | SYSTOLIC BLOOD PRESSURE: 123 MMHG | WEIGHT: 217 LBS | HEART RATE: 76 BPM | BODY MASS INDEX: 29.43 KG/M2

## 2022-10-11 DIAGNOSIS — N52.9 ERECTILE DYSFUNCTION, UNSPECIFIED ERECTILE DYSFUNCTION TYPE: Primary | ICD-10-CM

## 2022-10-11 PROCEDURE — G8484 FLU IMMUNIZE NO ADMIN: HCPCS | Performed by: PHYSICIAN ASSISTANT

## 2022-10-11 PROCEDURE — 4004F PT TOBACCO SCREEN RCVD TLK: CPT | Performed by: PHYSICIAN ASSISTANT

## 2022-10-11 PROCEDURE — G8419 CALC BMI OUT NRM PARAM NOF/U: HCPCS | Performed by: PHYSICIAN ASSISTANT

## 2022-10-11 PROCEDURE — 3017F COLORECTAL CA SCREEN DOC REV: CPT | Performed by: PHYSICIAN ASSISTANT

## 2022-10-11 PROCEDURE — G8427 DOCREV CUR MEDS BY ELIG CLIN: HCPCS | Performed by: PHYSICIAN ASSISTANT

## 2022-10-11 PROCEDURE — 99213 OFFICE O/P EST LOW 20 MIN: CPT | Performed by: PHYSICIAN ASSISTANT

## 2022-10-11 RX ORDER — SILDENAFIL CITRATE 20 MG/1
TABLET ORAL
Qty: 30 TABLET | Refills: 3 | Status: SHIPPED | OUTPATIENT
Start: 2022-10-11

## 2022-10-11 ASSESSMENT — ENCOUNTER SYMPTOMS
SHORTNESS OF BREATH: 0
BACK PAIN: 0
VOMITING: 0
WHEEZING: 0
COUGH: 0
COLOR CHANGE: 0
ABDOMINAL PAIN: 0
CONSTIPATION: 0
NAUSEA: 0
EYE REDNESS: 0

## 2022-10-11 NOTE — PROGRESS NOTES
HPI:      Patient is a 46 y.o. male in no acute distress. He is alert and oriented to person, place, and time. History  2016 Evaluated for ED. Given cialis 5mg as needed. LOST TO FOLLOW-UP     6/2018 Complaints of ED. He is unable to achieve or maintain an erection that is satisfactory for sexual activity. He denies HTN, DM, NORA, or any other co-morbid conditions. His BMI is 30.13. He does smoke and does not exercise. He did have good results with the cialis, but he can not afford this medication. CHARLEY 18, Started on revatio. Today:  Patient is here today for annual follow-up for erectile dysfunction. Patient has been taking Revatio 20mg as needed. He is able to achieve and maintain erection with this dosage. He states that if he drinks alcohol he may have to take 2 tablets. This is not a regular occurrence. He denies any chest pain, shortness of breath, dyspnea on exertion. He has not had any erections lasting more than 4 hours. he denies any new or worsening urinary tract symptoms. He is a current every day smoker. Past Medical History:   Diagnosis Date    Erb's palsy as birth trauma     GERD (gastroesophageal reflux disease)     Wears glasses      Past Surgical History:   Procedure Laterality Date    DENTAL SURGERY       Outpatient Encounter Medications as of 10/11/2022   Medication Sig Dispense Refill    sildenafil (REVATIO) 20 MG tablet TAKE 1-5 TABLETS BY MOUTH AS NEEDED FOR E.D. TAKE ON AN EMPTY [id number] MINUTES PRIOR TO ACTIVITY 30 tablet 3    [DISCONTINUED] sildenafil (REVATIO) 20 MG tablet TAKE 1-5 TABLETS BY MOUTH AS NEEDED FOR E.D. TAKE ON AN EMPTY [id number] MINUTES PRIOR TO ACTIVITY 30 tablet 3     No facility-administered encounter medications on file as of 10/11/2022. No current outpatient medications on file prior to visit. No current facility-administered medications on file prior to visit. Patient has no known allergies.   Family History   Problem Relation Age of Onset    Diabetes Mother     Heart Disease Mother     Diabetes Father     Other Father      Social History     Tobacco Use   Smoking Status Every Day    Packs/day: 0.50    Types: Cigarettes   Smokeless Tobacco Never       Social History     Substance and Sexual Activity   Alcohol Use Yes    Comment: weekend       Review of Systems   Constitutional:  Negative for appetite change, chills and fever. Eyes:  Negative for redness and visual disturbance. Respiratory:  Negative for cough, shortness of breath and wheezing. Cardiovascular:  Negative for chest pain and leg swelling. Gastrointestinal:  Negative for abdominal pain, constipation, nausea and vomiting. Genitourinary:  Negative for decreased urine volume, difficulty urinating, dysuria, enuresis, flank pain, frequency, hematuria, penile discharge, penile pain, scrotal swelling, testicular pain and urgency. Positive for erectile dysfunction   Musculoskeletal:  Negative for back pain, joint swelling and myalgias. Skin:  Negative for color change, rash and wound. Neurological:  Negative for dizziness, tremors and numbness. Hematological:  Negative for adenopathy. Does not bruise/bleed easily. /83 (Site: Right Upper Arm, Position: Sitting, Cuff Size: Large Adult)   Pulse 76   Wt 217 lb (98.4 kg)   BMI 29.43 kg/m²       PHYSICAL EXAM:  Constitutional: Patient in no acute distress; Neuro: alert and oriented to person place and time. Psych: Mood and affect normal.  Lungs: Respiratory effort normal  Abdomen: Soft, non-tender, non-distended  Rectal: Deferred      Lab Results   Component Value Date    BUN 15 05/11/2022     Lab Results   Component Value Date    CREATININE 0.82 05/11/2022     Lab Results   Component Value Date    PSA 0.46 02/05/2019       ASSESSMENT:   Diagnosis Orders   1.  Erectile dysfunction, unspecified erectile dysfunction type  sildenafil (REVATIO) 20 MG tablet            PLAN:  Continue Revatio as needed    Go to the emergency room have an erection lasting more mynor 4 hours

## 2023-05-17 ENCOUNTER — HOSPITAL ENCOUNTER (OUTPATIENT)
Age: 53
Discharge: HOME | End: 2023-05-17
Payer: MEDICAID

## 2023-05-17 DIAGNOSIS — Z00.00 ENCOUNTER FOR WELL ADULT EXAM WITHOUT ABNORMAL FINDINGS: ICD-10-CM

## 2023-05-17 LAB
ALBUMIN SERPL-MCNC: 4.4 G/DL (ref 3.5–5.2)
ALBUMIN/GLOB SERPL: 1.6 {RATIO} (ref 1–2.5)
ALP SERPL-CCNC: 94 U/L (ref 40–129)
ALT SERPL-CCNC: 18 U/L (ref 5–41)
ANION GAP SERPL CALCULATED.3IONS-SCNC: 9 MMOL/L (ref 9–17)
AST SERPL-CCNC: 20 U/L
BILIRUB SERPL-MCNC: 0.5 MG/DL (ref 0.3–1.2)
BUN SERPL-MCNC: 10 MG/DL (ref 6–20)
BUN/CREAT SERPL: 12 (ref 9–20)
CALCIUM SERPL-MCNC: 9.6 MG/DL (ref 8.6–10.4)
CHLORIDE SERPL-SCNC: 104 MMOL/L (ref 98–107)
CHOLEST SERPL-MCNC: 169 MG/DL
CHOLESTEROL/HDL RATIO: 3.8
CO2 SERPL-SCNC: 25 MMOL/L (ref 20–31)
CREAT SERPL-MCNC: 0.81 MG/DL (ref 0.7–1.2)
ERYTHROCYTE [DISTWIDTH] IN BLOOD BY AUTOMATED COUNT: 13.3 % (ref 11.8–14.4)
EST. AVERAGE GLUCOSE BLD GHB EST-MCNC: 123 MG/DL
GFR SERPL CREATININE-BSD FRML MDRD: >60 ML/MIN/1.73M2
GLUCOSE SERPL-MCNC: 111 MG/DL (ref 70–99)
HBA1C MFR BLD: 5.9 % (ref 4–6)
HCT VFR BLD AUTO: 44.8 % (ref 40.7–50.3)
HDLC SERPL-MCNC: 44 MG/DL
HGB BLD-MCNC: 14.7 G/DL (ref 13–17)
LDLC SERPL CALC-MCNC: 104 MG/DL (ref 0–130)
MCH RBC QN AUTO: 29.9 PG (ref 25.2–33.5)
MCHC RBC AUTO-ENTMCNC: 32.8 G/DL (ref 28.4–34.8)
MCV RBC AUTO: 91.2 FL (ref 82.6–102.9)
NRBC AUTOMATED: 0 PER 100 WBC
PLATELET # BLD AUTO: 223 K/UL (ref 138–453)
PMV BLD AUTO: 8.9 FL (ref 8.1–13.5)
POTASSIUM SERPL-SCNC: 4.5 MMOL/L (ref 3.7–5.3)
PROT SERPL-MCNC: 7.1 G/DL (ref 6.4–8.3)
RBC # BLD AUTO: 4.91 M/UL (ref 4.21–5.77)
SODIUM SERPL-SCNC: 138 MMOL/L (ref 135–144)
TRIGL SERPL-MCNC: 103 MG/DL
WBC OTHER # BLD: 5.5 K/UL (ref 3.5–11.3)

## 2023-05-17 PROCEDURE — 85027 COMPLETE CBC AUTOMATED: CPT

## 2023-05-17 PROCEDURE — 80053 COMPREHEN METABOLIC PANEL: CPT

## 2023-05-17 PROCEDURE — 83036 HEMOGLOBIN GLYCOSYLATED A1C: CPT

## 2023-05-17 PROCEDURE — 80061 LIPID PANEL: CPT

## 2023-05-17 PROCEDURE — 36415 COLL VENOUS BLD VENIPUNCTURE: CPT

## 2023-06-21 ENCOUNTER — TELEPHONE (OUTPATIENT)
Dept: UROLOGY | Age: 53
End: 2023-06-21

## 2023-06-21 DIAGNOSIS — N52.9 ERECTILE DYSFUNCTION, UNSPECIFIED ERECTILE DYSFUNCTION TYPE: ICD-10-CM

## 2023-06-21 RX ORDER — SILDENAFIL CITRATE 20 MG/1
TABLET ORAL
Qty: 30 TABLET | Refills: 3 | Status: SHIPPED | OUTPATIENT
Start: 2023-06-21

## 2023-06-21 NOTE — TELEPHONE ENCOUNTER
Patient seen within the past year. Patient tolerates Revatio well. We will refill his medication. Prescription sent to pharmacy of choice.

## 2023-10-17 ENCOUNTER — OFFICE VISIT (OUTPATIENT)
Dept: UROLOGY | Age: 53
End: 2023-10-17
Payer: MEDICAID

## 2023-10-17 VITALS
WEIGHT: 220 LBS | TEMPERATURE: 98.6 F | DIASTOLIC BLOOD PRESSURE: 88 MMHG | SYSTOLIC BLOOD PRESSURE: 144 MMHG | BODY MASS INDEX: 29.8 KG/M2 | HEIGHT: 72 IN | HEART RATE: 62 BPM

## 2023-10-17 DIAGNOSIS — N52.9 ERECTILE DYSFUNCTION, UNSPECIFIED ERECTILE DYSFUNCTION TYPE: Primary | ICD-10-CM

## 2023-10-17 PROCEDURE — 4004F PT TOBACCO SCREEN RCVD TLK: CPT | Performed by: PHYSICIAN ASSISTANT

## 2023-10-17 PROCEDURE — 99213 OFFICE O/P EST LOW 20 MIN: CPT | Performed by: PHYSICIAN ASSISTANT

## 2023-10-17 PROCEDURE — 3017F COLORECTAL CA SCREEN DOC REV: CPT | Performed by: PHYSICIAN ASSISTANT

## 2023-10-17 PROCEDURE — G8427 DOCREV CUR MEDS BY ELIG CLIN: HCPCS | Performed by: PHYSICIAN ASSISTANT

## 2023-10-17 PROCEDURE — G8417 CALC BMI ABV UP PARAM F/U: HCPCS | Performed by: PHYSICIAN ASSISTANT

## 2023-10-17 PROCEDURE — G8484 FLU IMMUNIZE NO ADMIN: HCPCS | Performed by: PHYSICIAN ASSISTANT

## 2023-10-17 RX ORDER — SILDENAFIL CITRATE 20 MG/1
TABLET ORAL
Qty: 30 TABLET | Refills: 3 | Status: SHIPPED | OUTPATIENT
Start: 2023-10-17

## 2023-10-17 ASSESSMENT — ENCOUNTER SYMPTOMS
WHEEZING: 0
SHORTNESS OF BREATH: 0
EYE REDNESS: 0
CONSTIPATION: 0
BACK PAIN: 0
VOMITING: 0
COUGH: 0
ABDOMINAL PAIN: 0
COLOR CHANGE: 0
NAUSEA: 0

## 2023-10-17 NOTE — PROGRESS NOTES
sildenafil (REVATIO) 20 MG tablet            PLAN:  Continue sildenafil as needed    Recommended smoking cessation    Follow-up in 1 year

## 2024-05-03 ENCOUNTER — HOSPITAL ENCOUNTER (OUTPATIENT)
Age: 54
Discharge: HOME | End: 2024-05-03
Payer: COMMERCIAL

## 2024-05-03 DIAGNOSIS — Z13.220 LIPID SCREENING: ICD-10-CM

## 2024-05-03 DIAGNOSIS — Z12.5 SCREENING PSA (PROSTATE SPECIFIC ANTIGEN): ICD-10-CM

## 2024-05-03 DIAGNOSIS — E78.1 HIGH TRIGLYCERIDES: ICD-10-CM

## 2024-05-03 DIAGNOSIS — R73.01 IMPAIRED FASTING GLUCOSE: ICD-10-CM

## 2024-05-03 DIAGNOSIS — Z00.00 ROUTINE GENERAL MEDICAL EXAMINATION AT A HEALTH CARE FACILITY: ICD-10-CM

## 2024-05-03 LAB
ALT SERPL-CCNC: 25 U/L (ref 5–41)
ANION GAP SERPL CALCULATED.3IONS-SCNC: 10 MMOL/L (ref 9–17)
AST SERPL-CCNC: 25 U/L
BUN SERPL-MCNC: 10 MG/DL (ref 6–20)
BUN/CREAT SERPL: 13 (ref 9–20)
CALCIUM SERPL-MCNC: 9.1 MG/DL (ref 8.6–10.4)
CHLORIDE SERPL-SCNC: 104 MMOL/L (ref 98–107)
CO2 SERPL-SCNC: 23 MMOL/L (ref 20–31)
CREAT SERPL-MCNC: 0.8 MG/DL (ref 0.7–1.2)
ERYTHROCYTE [DISTWIDTH] IN BLOOD BY AUTOMATED COUNT: 13.2 % (ref 11.8–14.4)
EST. AVERAGE GLUCOSE BLD GHB EST-MCNC: 117 MG/DL
GFR, ESTIMATED: >90 ML/MIN/1.73M2
GLUCOSE SERPL-MCNC: 92 MG/DL (ref 70–99)
HBA1C MFR BLD: 5.7 % (ref 4–6)
HCT VFR BLD AUTO: 41.4 % (ref 40.7–50.3)
HGB BLD-MCNC: 14 G/DL (ref 13–17)
MCH RBC QN AUTO: 30.4 PG (ref 25.2–33.5)
MCHC RBC AUTO-ENTMCNC: 33.8 G/DL (ref 28.4–34.8)
MCV RBC AUTO: 89.8 FL (ref 82.6–102.9)
NRBC BLD-RTO: 0 PER 100 WBC
PLATELET # BLD AUTO: 229 K/UL (ref 138–453)
PMV BLD AUTO: 9.1 FL (ref 8.1–13.5)
POTASSIUM SERPL-SCNC: 4.5 MMOL/L (ref 3.7–5.3)
RBC # BLD AUTO: 4.61 M/UL (ref 4.21–5.77)
SODIUM SERPL-SCNC: 137 MMOL/L (ref 135–144)
WBC OTHER # BLD: 6.3 K/UL (ref 3.5–11.3)

## 2024-05-03 PROCEDURE — 80061 LIPID PANEL: CPT

## 2024-05-03 PROCEDURE — 36415 COLL VENOUS BLD VENIPUNCTURE: CPT

## 2024-05-03 PROCEDURE — G0103 PSA SCREENING: HCPCS

## 2024-05-03 PROCEDURE — 83036 HEMOGLOBIN GLYCOSYLATED A1C: CPT

## 2024-05-03 PROCEDURE — 84450 TRANSFERASE (AST) (SGOT): CPT

## 2024-05-03 PROCEDURE — 84460 ALANINE AMINO (ALT) (SGPT): CPT

## 2024-05-03 PROCEDURE — 85027 COMPLETE CBC AUTOMATED: CPT

## 2024-05-03 PROCEDURE — 80048 BASIC METABOLIC PNL TOTAL CA: CPT

## 2024-05-04 LAB
CHOLEST SERPL-MCNC: 159 MG/DL (ref 0–199)
CHOLESTEROL/HDL RATIO: 3
HDLC SERPL-MCNC: 51 MG/DL
LDLC SERPL CALC-MCNC: 92 MG/DL (ref 0–100)
PSA SERPL-MCNC: 0.7 NG/ML (ref 0–4)
TRIGL SERPL-MCNC: 77 MG/DL
VLDLC SERPL CALC-MCNC: 15 MG/DL

## 2024-10-16 ENCOUNTER — OFFICE VISIT (OUTPATIENT)
Dept: UROLOGY | Age: 54
End: 2024-10-16
Payer: COMMERCIAL

## 2024-10-16 VITALS
TEMPERATURE: 98 F | DIASTOLIC BLOOD PRESSURE: 98 MMHG | WEIGHT: 236 LBS | BODY MASS INDEX: 32.28 KG/M2 | SYSTOLIC BLOOD PRESSURE: 150 MMHG

## 2024-10-16 DIAGNOSIS — N52.9 ERECTILE DYSFUNCTION, UNSPECIFIED ERECTILE DYSFUNCTION TYPE: Primary | ICD-10-CM

## 2024-10-16 PROCEDURE — 99214 OFFICE O/P EST MOD 30 MIN: CPT | Performed by: PHYSICIAN ASSISTANT

## 2024-10-16 RX ORDER — SILDENAFIL CITRATE 20 MG/1
TABLET ORAL
Qty: 30 TABLET | Refills: 3 | Status: SHIPPED | OUTPATIENT
Start: 2024-10-16

## 2024-10-16 NOTE — PROGRESS NOTES
HPI:      Patient is a 53 y.o. male in no acute distress.  He is alert and oriented to person, place, and time.       History  2016 Evaluated for ED. Given cialis 5mg as needed. LOST TO FOLLOW-UP     6/2018 Complaints of ED. He is unable to achieve or maintain an erection that is satisfactory for sexual activity. He denies HTN, DM, NORA, or any other co-morbid conditions. His BMI is 30.13. He does smoke and does not exercise. He did have good results with the cialis, but he can not afford this medication.               CHARLEY 16, Started on revatio.    Today:  Patient is here today for annual follow-up for erectile dysfunction.  Patient has been taking Revatio 40mg as needed. He is able to achieve and maintain erection with this dosage. He denies any chest pain, shortness of breath, dyspnea on exertion.  He has not had any erections lasting more than 4 hours.  he denies any new or worsening urinary tract symptoms.  Patient continues to smoke.    Past Medical History:   Diagnosis Date    Erb's palsy as birth trauma     GERD (gastroesophageal reflux disease)     Wears glasses      Past Surgical History:   Procedure Laterality Date    DENTAL SURGERY       Outpatient Encounter Medications as of 10/16/2024   Medication Sig Dispense Refill    sildenafil (REVATIO) 20 MG tablet TAKE 1-5 TABLETS BY MOUTH AS NEEDED FOR E.D. TAKE ON AN EMPTY [id number] MINUTES PRIOR TO ACTIVITY 30 tablet 3    [DISCONTINUED] sildenafil (REVATIO) 20 MG tablet TAKE 1-5 TABLETS BY MOUTH AS NEEDED FOR E.D. TAKE ON AN EMPTY [id number] MINUTES PRIOR TO ACTIVITY 30 tablet 3     No facility-administered encounter medications on file as of 10/16/2024.      No current outpatient medications on file prior to visit.     No current facility-administered medications on file prior to visit.     Patient has no known allergies.  Family History   Problem Relation Age of Onset    Diabetes Mother     Heart Disease Mother     Diabetes Father     Other Father

## 2025-05-16 ENCOUNTER — HOSPITAL ENCOUNTER (OUTPATIENT)
Age: 55
Discharge: HOME | End: 2025-05-16
Payer: COMMERCIAL

## 2025-05-16 DIAGNOSIS — R73.01 IMPAIRED FASTING GLUCOSE: ICD-10-CM

## 2025-05-16 DIAGNOSIS — E78.1 HIGH TRIGLYCERIDES: ICD-10-CM

## 2025-05-16 DIAGNOSIS — Z00.00 ROUTINE GENERAL MEDICAL EXAMINATION AT A HEALTH CARE FACILITY: ICD-10-CM

## 2025-05-16 LAB
ALT SERPL-CCNC: 31 U/L (ref 10–50)
ANION GAP SERPL CALCULATED.3IONS-SCNC: 12 MMOL/L (ref 9–16)
AST SERPL-CCNC: 34 U/L (ref 10–50)
BUN SERPL-MCNC: 8 MG/DL (ref 6–20)
BUN/CREAT SERPL: 9 (ref 9–20)
CALCIUM SERPL-MCNC: 9.6 MG/DL (ref 8.6–10.4)
CHLORIDE SERPL-SCNC: 100 MMOL/L (ref 98–107)
CHOLEST SERPL-MCNC: 186 MG/DL (ref 0–199)
CHOLESTEROL/HDL RATIO: 4
CO2 SERPL-SCNC: 25 MMOL/L (ref 20–31)
CREAT SERPL-MCNC: 0.9 MG/DL (ref 0.7–1.2)
ERYTHROCYTE [DISTWIDTH] IN BLOOD BY AUTOMATED COUNT: 13.5 % (ref 11.8–14.4)
EST. AVERAGE GLUCOSE BLD GHB EST-MCNC: 108 MG/DL
GFR, ESTIMATED: >90 ML/MIN/1.73M2
GLUCOSE SERPL-MCNC: 102 MG/DL (ref 74–99)
HBA1C MFR BLD: 5.4 % (ref 4–6)
HCT VFR BLD AUTO: 46.3 % (ref 40.7–50.3)
HDLC SERPL-MCNC: 46 MG/DL
HGB BLD-MCNC: 15.5 G/DL (ref 13–17)
LDLC SERPL CALC-MCNC: 118 MG/DL (ref 0–100)
MCH RBC QN AUTO: 31.1 PG (ref 25.2–33.5)
MCHC RBC AUTO-ENTMCNC: 33.5 G/DL (ref 28.4–34.8)
MCV RBC AUTO: 92.8 FL (ref 82.6–102.9)
NRBC BLD-RTO: 0 PER 100 WBC
PLATELET # BLD AUTO: 204 K/UL (ref 138–453)
PMV BLD AUTO: 9.6 FL (ref 8.1–13.5)
POTASSIUM SERPL-SCNC: 4.3 MMOL/L (ref 3.7–5.3)
RBC # BLD AUTO: 4.99 M/UL (ref 4.21–5.77)
SODIUM SERPL-SCNC: 137 MMOL/L (ref 136–145)
TRIGL SERPL-MCNC: 112 MG/DL
VLDLC SERPL CALC-MCNC: 22 MG/DL (ref 1–30)
WBC OTHER # BLD: 6.3 K/UL (ref 3.5–11.3)

## 2025-05-16 PROCEDURE — 80048 BASIC METABOLIC PNL TOTAL CA: CPT

## 2025-05-16 PROCEDURE — 83036 HEMOGLOBIN GLYCOSYLATED A1C: CPT

## 2025-05-16 PROCEDURE — 85027 COMPLETE CBC AUTOMATED: CPT

## 2025-05-16 PROCEDURE — 80061 LIPID PANEL: CPT

## 2025-05-16 PROCEDURE — 84450 TRANSFERASE (AST) (SGOT): CPT

## 2025-05-16 PROCEDURE — 36415 COLL VENOUS BLD VENIPUNCTURE: CPT

## 2025-05-16 PROCEDURE — 84460 ALANINE AMINO (ALT) (SGPT): CPT
